# Patient Record
Sex: FEMALE | Race: BLACK OR AFRICAN AMERICAN | NOT HISPANIC OR LATINO | URBAN - METROPOLITAN AREA
[De-identification: names, ages, dates, MRNs, and addresses within clinical notes are randomized per-mention and may not be internally consistent; named-entity substitution may affect disease eponyms.]

---

## 2018-08-22 ENCOUNTER — EMERGENCY (EMERGENCY)
Facility: HOSPITAL | Age: 43
LOS: 0 days | Discharge: HOME | End: 2018-08-22
Admitting: EMERGENCY MEDICINE

## 2018-08-22 VITALS
RESPIRATION RATE: 18 BRPM | DIASTOLIC BLOOD PRESSURE: 72 MMHG | TEMPERATURE: 98 F | HEART RATE: 81 BPM | OXYGEN SATURATION: 100 % | SYSTOLIC BLOOD PRESSURE: 148 MMHG
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
## 2018-08-22 DIAGNOSIS — F17.200 NICOTINE DEPENDENCE, UNSPECIFIED, UNCOMPLICATED: ICD-10-CM

## 2018-08-22 DIAGNOSIS — M54.5 LOW BACK PAIN: ICD-10-CM
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
## 2018-08-22 DIAGNOSIS — M25.541 PAIN IN JOINTS OF RIGHT HAND: ICD-10-CM
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
## 2018-08-22 RX ORDER — METHOCARBAMOL 500 MG/1
2 TABLET, FILM COATED ORAL
Qty: 30 | Refills: 0 | OUTPATIENT
Start: 2018-08-22 | End: 2018-08-26

## 2018-08-22 RX ORDER — METHOCARBAMOL 500 MG/1
1500 TABLET, FILM COATED ORAL ONCE
Qty: 0 | Refills: 0 | Status: COMPLETED | OUTPATIENT
Start: 2018-08-22 | End: 2018-08-22

## 2018-08-22 RX ORDER — IBUPROFEN 200 MG
800 TABLET ORAL ONCE
Qty: 0 | Refills: 0 | Status: COMPLETED | OUTPATIENT
Start: 2018-08-22 | End: 2018-08-22

## 2018-08-22 RX ADMIN — Medication 800 MILLIGRAM(S): at 15:55

## 2018-08-22 RX ADMIN — METHOCARBAMOL 1500 MILLIGRAM(S): 500 TABLET, FILM COATED ORAL at 15:55

## 2018-08-22 NOTE — ED PROVIDER NOTE - PHYSICAL EXAMINATION
VITAL SIGNS: I have reviewed nursing notes and confirm.  CONSTITUTIONAL: Well-developed; well-nourished; in no acute distress.  SKIN: Skin exam is warm and dry, <2 sec cap refill. no erythema, ecchymosis  CARD: RRR, 2+ dp pulses  ABD: soft non tender. No CVA tenderness Negative Mcburneys, Negative [place], Negative Psoas, Negative obturator   EXT: Normal ROM. Normal gait. no midline vertebral tenderness. R sided lumbar paraspinal tenderness. pain with radiation down R leg with palpation of sciatic notch. R hand TTP, FROM of upper extremity. NVI  NEURO: Alert and oriented, face symmetric and speech fluent. Strength 5/5 x 4 ext and symmetric, nml gross motor movement,  nml gait. No focal deficits noted. neurovascularly intact. negative romberg, negative pronator drift. normal gait. normal finger to nose. VITAL SIGNS: I have reviewed nursing notes and confirm.  CONSTITUTIONAL: Well-developed; well-nourished; in no acute distress.  SKIN: Skin exam is warm and dry, <2 sec cap refill. no erythema, ecchymosis  CARD: RRR, 2+ dp pulses  ABD: soft non tender. No CVA tenderness Negative Mcburneys, Negative [place], Negative Psoas, Negative obturator   EXT: Normal ROM. Normal gait. no midline vertebral tenderness. R sided lumbar paraspinal tenderness. pain with radiation down R leg with palpation of sciatic notch. R hand TTP on anterior proximal aspect of mid hand, no scaphoid tenderness, FROM of upper extremity. NVI  NEURO: Alert and oriented, face symmetric and speech fluent. Strength 5/5 x 4 ext and symmetric, nml gross motor movement,  nml gait. No focal deficits noted. neurovascularly intact. negative romberg, negative pronator drift. normal gait. normal finger to nose.

## 2018-08-22 NOTE — ED PROVIDER NOTE - NS ED ROS FT
Review of Systems:  CONSTITUTIONAL: no fever  EYES: no photophobia, no blurred vision  ENT: no ear pain, no nasal discharge  RESPIRATORY: no shortness of breath, no cough  CARDIAC: no chest pain, no palpitations  GI: no abd pain, no nausea, no vomiting,   : no dysuria; no hematuria,   MUSCULOSKELETAL: +back pain, + R hand pain   NEUROLOGIC: no headache, no numbness/tingling/weakness, no LOC

## 2018-08-22 NOTE — ED ADULT TRIAGE NOTE - CHIEF COMPLAINT QUOTE
fell down 4 steps onto buttock. denies hitting head or any LOC, GCS=15. denies taking blood thinners daily.

## 2018-08-22 NOTE — ED PROVIDER NOTE - OBJECTIVE STATEMENT
44 y/o F, h/o chronic back pain, presents with R low back pain and L hand pain s/p slip and fall on slippery steps at around 930 this morning. Pt states she landed on her buttocks, and did not hit her head. She was able to stand on her own and was ambulatory after. Pt notes increased pain with palpation. no blood thinner use. Denies fever, chills, CP, SOB, numbness, tingling, weakness, radiation.

## 2018-08-22 NOTE — ED PROVIDER NOTE - PROGRESS NOTE DETAILS
no concern for pregnancy-refusing POCT pregnancy test. will give ibuprofen I was directly involved in the care of this patient. PA Fellow Le note/plan reviewed and agreed. xrays negative follow up with pmd discussed

## 2021-04-05 ENCOUNTER — OUTPATIENT (OUTPATIENT)
Dept: OUTPATIENT SERVICES | Facility: HOSPITAL | Age: 46
LOS: 1 days | Discharge: HOME | End: 2021-04-05

## 2021-04-05 DIAGNOSIS — Z01.21 ENCOUNTER FOR DENTAL EXAMINATION AND CLEANING WITH ABNORMAL FINDINGS: ICD-10-CM

## 2021-04-05 PROBLEM — M54.9 DORSALGIA, UNSPECIFIED: Chronic | Status: ACTIVE | Noted: 2018-08-22

## 2021-05-25 ENCOUNTER — OUTPATIENT (OUTPATIENT)
Dept: OUTPATIENT SERVICES | Facility: HOSPITAL | Age: 46
LOS: 1 days | Discharge: HOME | End: 2021-05-25

## 2021-06-28 ENCOUNTER — OUTPATIENT (OUTPATIENT)
Dept: OUTPATIENT SERVICES | Facility: HOSPITAL | Age: 46
LOS: 1 days | Discharge: HOME | End: 2021-06-28

## 2021-08-13 ENCOUNTER — OUTPATIENT (OUTPATIENT)
Dept: OUTPATIENT SERVICES | Facility: HOSPITAL | Age: 46
LOS: 1 days | Discharge: HOME | End: 2021-08-13

## 2021-10-15 ENCOUNTER — OUTPATIENT (OUTPATIENT)
Dept: OUTPATIENT SERVICES | Facility: HOSPITAL | Age: 46
LOS: 1 days | Discharge: HOME | End: 2021-10-15

## 2021-10-22 ENCOUNTER — OUTPATIENT (OUTPATIENT)
Dept: OUTPATIENT SERVICES | Facility: HOSPITAL | Age: 46
LOS: 1 days | Discharge: HOME | End: 2021-10-22

## 2021-10-29 ENCOUNTER — OUTPATIENT (OUTPATIENT)
Dept: OUTPATIENT SERVICES | Facility: HOSPITAL | Age: 46
LOS: 1 days | Discharge: HOME | End: 2021-10-29

## 2022-01-04 ENCOUNTER — OUTPATIENT (OUTPATIENT)
Dept: OUTPATIENT SERVICES | Facility: HOSPITAL | Age: 47
LOS: 1 days | End: 2022-01-04

## 2022-02-08 ENCOUNTER — OUTPATIENT (OUTPATIENT)
Dept: OUTPATIENT SERVICES | Facility: HOSPITAL | Age: 47
LOS: 1 days | Discharge: HOME | End: 2022-02-08

## 2022-05-17 ENCOUNTER — OUTPATIENT (OUTPATIENT)
Dept: OUTPATIENT SERVICES | Facility: HOSPITAL | Age: 47
LOS: 1 days | Discharge: HOME | End: 2022-05-17

## 2022-05-19 ENCOUNTER — OUTPATIENT (OUTPATIENT)
Dept: OUTPATIENT SERVICES | Facility: HOSPITAL | Age: 47
LOS: 1 days | Discharge: HOME | End: 2022-05-19

## 2022-05-20 DIAGNOSIS — K02.63 DENTAL CARIES ON SMOOTH SURFACE PENETRATING INTO PULP: ICD-10-CM

## 2022-12-29 ENCOUNTER — OUTPATIENT (OUTPATIENT)
Dept: OUTPATIENT SERVICES | Facility: HOSPITAL | Age: 47
LOS: 1 days | End: 2022-12-29

## 2023-01-26 ENCOUNTER — OUTPATIENT (OUTPATIENT)
Dept: OUTPATIENT SERVICES | Facility: HOSPITAL | Age: 48
LOS: 1 days | End: 2023-01-26

## 2023-01-27 DIAGNOSIS — K02.63 DENTAL CARIES ON SMOOTH SURFACE PENETRATING INTO PULP: ICD-10-CM

## 2023-02-09 ENCOUNTER — OUTPATIENT (OUTPATIENT)
Dept: OUTPATIENT SERVICES | Facility: HOSPITAL | Age: 48
LOS: 1 days | End: 2023-02-09

## 2023-02-09 DIAGNOSIS — Z01.20 ENCOUNTER FOR DENTAL EXAMINATION AND CLEANING WITHOUT ABNORMAL FINDINGS: ICD-10-CM

## 2023-02-09 PROCEDURE — D2752: CPT

## 2023-02-10 DIAGNOSIS — Z01.20 ENCOUNTER FOR DENTAL EXAMINATION AND CLEANING WITHOUT ABNORMAL FINDINGS: ICD-10-CM

## 2023-02-23 ENCOUNTER — OUTPATIENT (OUTPATIENT)
Dept: OUTPATIENT SERVICES | Facility: HOSPITAL | Age: 48
LOS: 1 days | End: 2023-02-23

## 2023-02-23 DIAGNOSIS — Z01.20 ENCOUNTER FOR DENTAL EXAMINATION AND CLEANING WITHOUT ABNORMAL FINDINGS: ICD-10-CM

## 2023-02-27 DIAGNOSIS — K02.63 DENTAL CARIES ON SMOOTH SURFACE PENETRATING INTO PULP: ICD-10-CM

## 2023-06-15 ENCOUNTER — OUTPATIENT (OUTPATIENT)
Dept: OUTPATIENT SERVICES | Facility: HOSPITAL | Age: 48
LOS: 1 days | End: 2023-06-15
Payer: COMMERCIAL

## 2023-06-15 DIAGNOSIS — K08.109 COMPLETE LOSS OF TEETH, UNSPECIFIED CAUSE, UNSPECIFIED CLASS: ICD-10-CM

## 2023-06-15 PROCEDURE — D2752: CPT

## 2023-06-16 DIAGNOSIS — K08.109 COMPLETE LOSS OF TEETH, UNSPECIFIED CAUSE, UNSPECIFIED CLASS: ICD-10-CM

## 2023-06-26 DIAGNOSIS — K02.63 DENTAL CARIES ON SMOOTH SURFACE PENETRATING INTO PULP: ICD-10-CM

## 2023-07-13 ENCOUNTER — OUTPATIENT (OUTPATIENT)
Dept: OUTPATIENT SERVICES | Facility: HOSPITAL | Age: 48
LOS: 1 days | End: 2023-07-13
Payer: COMMERCIAL

## 2023-07-13 DIAGNOSIS — Z01.20 ENCOUNTER FOR DENTAL EXAMINATION AND CLEANING WITHOUT ABNORMAL FINDINGS: ICD-10-CM

## 2023-07-13 PROCEDURE — D2752: CPT

## 2023-07-26 DIAGNOSIS — K02.63 DENTAL CARIES ON SMOOTH SURFACE PENETRATING INTO PULP: ICD-10-CM

## 2023-08-10 ENCOUNTER — OUTPATIENT (OUTPATIENT)
Dept: OUTPATIENT SERVICES | Facility: HOSPITAL | Age: 48
LOS: 1 days | End: 2023-08-10

## 2023-08-10 DIAGNOSIS — K08.109 COMPLETE LOSS OF TEETH, UNSPECIFIED CAUSE, UNSPECIFIED CLASS: ICD-10-CM

## 2023-08-24 DIAGNOSIS — K02.63 DENTAL CARIES ON SMOOTH SURFACE PENETRATING INTO PULP: ICD-10-CM

## 2023-11-28 ENCOUNTER — OUTPATIENT (OUTPATIENT)
Dept: OUTPATIENT SERVICES | Facility: HOSPITAL | Age: 48
LOS: 1 days | End: 2023-11-28
Payer: COMMERCIAL

## 2023-11-28 DIAGNOSIS — K08.109 COMPLETE LOSS OF TEETH, UNSPECIFIED CAUSE, UNSPECIFIED CLASS: ICD-10-CM

## 2023-11-28 PROCEDURE — D2752: CPT

## 2023-11-29 DIAGNOSIS — K08.109 COMPLETE LOSS OF TEETH, UNSPECIFIED CAUSE, UNSPECIFIED CLASS: ICD-10-CM

## 2023-12-08 DIAGNOSIS — K02.63 DENTAL CARIES ON SMOOTH SURFACE PENETRATING INTO PULP: ICD-10-CM

## 2023-12-21 ENCOUNTER — OUTPATIENT (OUTPATIENT)
Dept: OUTPATIENT SERVICES | Facility: HOSPITAL | Age: 48
LOS: 1 days | End: 2023-12-21
Payer: COMMERCIAL

## 2023-12-21 DIAGNOSIS — K02.9 DENTAL CARIES, UNSPECIFIED: ICD-10-CM

## 2023-12-21 PROCEDURE — D2954: CPT

## 2023-12-26 DIAGNOSIS — K02.9 DENTAL CARIES, UNSPECIFIED: ICD-10-CM

## 2024-01-04 ENCOUNTER — OUTPATIENT (OUTPATIENT)
Dept: OUTPATIENT SERVICES | Facility: HOSPITAL | Age: 49
LOS: 1 days | End: 2024-01-04

## 2024-01-04 DIAGNOSIS — K08.109 COMPLETE LOSS OF TEETH, UNSPECIFIED CAUSE, UNSPECIFIED CLASS: ICD-10-CM

## 2024-01-05 DIAGNOSIS — K02.9 DENTAL CARIES, UNSPECIFIED: ICD-10-CM

## 2024-01-18 ENCOUNTER — OUTPATIENT (OUTPATIENT)
Dept: OUTPATIENT SERVICES | Facility: HOSPITAL | Age: 49
LOS: 1 days | End: 2024-01-18

## 2024-01-18 DIAGNOSIS — K08.109 COMPLETE LOSS OF TEETH, UNSPECIFIED CAUSE, UNSPECIFIED CLASS: ICD-10-CM

## 2024-02-01 ENCOUNTER — OUTPATIENT (OUTPATIENT)
Dept: OUTPATIENT SERVICES | Facility: HOSPITAL | Age: 49
LOS: 1 days | End: 2024-02-01

## 2024-02-01 DIAGNOSIS — K08.109 COMPLETE LOSS OF TEETH, UNSPECIFIED CAUSE, UNSPECIFIED CLASS: ICD-10-CM

## 2024-02-02 DIAGNOSIS — K08.539 FRACTURED DENTAL RESTORATIVE MATERIAL, UNSPECIFIED: ICD-10-CM

## 2025-03-12 ENCOUNTER — EMERGENCY (EMERGENCY)
Facility: HOSPITAL | Age: 50
LOS: 0 days | Discharge: HOME | End: 2025-03-12
Attending: STUDENT IN AN ORGANIZED HEALTH CARE EDUCATION/TRAINING PROGRAM
Payer: MEDICARE

## 2025-03-12 VITALS
RESPIRATION RATE: 18 BRPM | DIASTOLIC BLOOD PRESSURE: 93 MMHG | WEIGHT: 293 LBS | OXYGEN SATURATION: 100 % | HEART RATE: 72 BPM | TEMPERATURE: 99 F | SYSTOLIC BLOOD PRESSURE: 195 MMHG

## 2025-03-12 PROCEDURE — 73090 X-RAY EXAM OF FOREARM: CPT | Mod: 26,RT

## 2025-03-12 PROCEDURE — 73130 X-RAY EXAM OF HAND: CPT | Mod: RT

## 2025-03-12 PROCEDURE — 99284 EMERGENCY DEPT VISIT MOD MDM: CPT | Mod: 25

## 2025-03-12 PROCEDURE — 99284 EMERGENCY DEPT VISIT MOD MDM: CPT

## 2025-03-12 PROCEDURE — 73110 X-RAY EXAM OF WRIST: CPT | Mod: 26,RT

## 2025-03-12 PROCEDURE — 73110 X-RAY EXAM OF WRIST: CPT | Mod: RT

## 2025-03-12 PROCEDURE — 73130 X-RAY EXAM OF HAND: CPT | Mod: 26,RT

## 2025-03-12 PROCEDURE — 73090 X-RAY EXAM OF FOREARM: CPT | Mod: RT

## 2025-03-12 RX ORDER — IBUPROFEN 200 MG
600 TABLET ORAL ONCE
Refills: 0 | Status: COMPLETED | OUTPATIENT
Start: 2025-03-12 | End: 2025-03-12

## 2025-03-12 RX ADMIN — Medication 600 MILLIGRAM(S): at 20:36

## 2025-03-12 NOTE — ED PROVIDER NOTE - PROVIDER TOKENS
FREE:[LAST:[your primary doctor],PHONE:[(   )    -],FAX:[(   )    -],FOLLOWUP:[1-3 Days]],PROVIDER:[TOKEN:[[id number]],FOLLOWUP:[1-3 Days]]

## 2025-03-12 NOTE — ED PROVIDER NOTE - PATIENT PORTAL LINK FT
You can access the FollowMyHealth Patient Portal offered by [facility] by registering at the following website: [email or web address]. By joining [facility]’s FollowMyHealth portal, you will also be able to view your health information using other applications (apps) compatible with our system.

## 2025-03-12 NOTE — ED PROVIDER NOTE - CARE PROVIDER_API CALL
your primary doctor,   Phone: (   )    -  Fax: (   )    -  Follow Up Time: 1-3 Days    Fortino Naidu  Orthopaedic Surgery  [address] [place] [address]  Phone: [phone]  Fax: [phone]  Follow Up Time: 1-3 Days

## 2025-03-12 NOTE — ED PROVIDER NOTE - CLINICAL SUMMARY MEDICAL DECISION MAKING FREE TEXT BOX
49-year-old female presents to the ED for evaluation of right wrist pain after an injury 1 month prior to evaluation.  States that the pain is intermittent and occasionally worsens with overuse.  No edema.  No fevers.  On exam no evidence of cellulitis, no significant bony tenderness on exam, full range of motion of wrist is intact.  Neurovascular tact right hand.  Xray images independently interpreted by me, Dr. Santos, no acute fractures.  Stable for discharge, follow-up with hand surgery for further management.

## 2025-03-12 NOTE — ED PROVIDER NOTE - OBJECTIVE STATEMENT
Patient is a 49-year-old female no significant PMH coming to the ED for right wrist injury.  Patient reports 1 month ago had mechanical trip and fall landing on her right hand/wrist, has had pain since saw her doctor/physical therapist and has been doing physical therapy for her wrist with minimal relief.  Denies numbness/paresthesia, weakness, other extremity injury/pain, head trauma, LOC, neck pain, chest pain, shortness of breath, abdominal pain, nausea vomiting diarrhea constipation

## 2025-03-12 NOTE — ED PROVIDER NOTE - PHYSICAL EXAMINATION
CONST: Well appearing in NAD  EYES: EOMI, Sclera and conjunctiva clear.   ENT: No nasal discharge. Oropharynx normal appearing, no erythema or exudates. Uvula midline.  CARD: Normal S1 S2; Normal rate and rhythm  RESP: Equal BS B/L, No wheezes, rhonchi or rales. No distress  GI: Soft, non-distended.  MS: Normal ROM in all extremities. TTP right wrist without bony deformity or skin changes  SKIN: Warm, dry, no acute rashes. Good turgor  NEURO: A&Ox3, No focal deficits. Strength 5/5 with no sensory deficits. Steady gait

## 2025-03-12 NOTE — ED PROVIDER NOTE - NSFOLLOWUPINSTRUCTIONS_ED_ALL_ED_FT
FOLLOW UP WITH ORTHOPEDICS  FOLLOW UP WITH YOUR PRIMARY DOCTOR  RETURN TO ED FOR NEW OR WORSENING SYMPTOMS    Wrist Injury    WHAT YOU NEED TO KNOW:    What is a wrist injury? A wrist injury is damage to the tissues of your wrist joint. Examples are a fracture, sprain (stretched or torn ligament), or strain (stretched or torn tendon).    What are the signs and symptoms of a wrist injury?    Pain, weakness, or numbness in your wrist or hand    A feeling of something clicking, popping, or tearing inside your wrist    A change in the shape of your wrist, hand, or fingers    Trouble moving your wrist or hand  How is a wrist injury diagnosed? Your healthcare provider will check for pain, swelling, or numbness. He or she may check the movement and strength of your wrist. An x-ray, MRI, or CT scan may show if you have broken a bone or other injury. You may be given contrast liquid to help your wrist show up better in the pictures. Tell the healthcare provider if you have ever had an allergic reaction to contrast liquid. Do not enter the MRI room with anything metal. Metal can cause serious injury. Tell the healthcare provider if you have any metal in or on your body.    How is a wrist injury treated? Your treatment depends on the type of wrist injury and amount of tissue damage you have. You may need any of the following:    A wrist support, such as a cast or splint, will help support your wrist and prevent more damage.    NSAIDs help decrease swelling and pain or fever. This medicine is available with or without a doctor's order. NSAIDs can cause stomach bleeding or kidney problems in certain people. If you take blood thinner medicine, always ask your healthcare provider if NSAIDs are safe for you. Always read the medicine label and follow directions.    Prescription pain medicine may be given. Ask your healthcare provider how to take this medicine safely. Some prescription pain medicines contain acetaminophen. Do not take other medicines that contain acetaminophen without talking to your healthcare provider. Too much acetaminophen may cause liver damage. Prescription pain medicine may cause constipation. Ask your healthcare provider how to prevent or treat constipation.    Steroids decrease swelling and pain in your wrist. This may be given as a shot.    Surgery may be used to repair a tear or remove injured and loose tissues. A torn ligament may be repaired or replaced. Screws or wires may be used to attach the bones in your wrist together.  How can I manage my symptoms?    Rest your wrist for 48 hours, or as directed. Avoid activities that cause pain. Ask which activities you should avoid and for how long. Ask when you may return to your regular physical activities or sports. If you start to use your wrist too soon, you may injure it wrist again.    Put ice on your wrist to decrease pain and swelling. Use an ice pack, or put crushed ice in a plastic bag. Wrap a towel around the bag before you put it on your wrist. Apply ice for 15 minutes every hour, or as directed.    Apply compression by wrapping your wrist with an elastic bandage. This will help decrease swelling, support your wrist, and help it heal. Wear your wrist wrap as directed. The bandage should be snug but not so tight that your fingers are numb or tingly.    Elevate your wrist above the level of your heart when you sit or lie down. Prop your arm and hand on pillows to keep your wrist elevated comfortably.    Go to physical therapy, if directed. A physical therapist can teach you exercises to strengthen your wrist and improve the range of movement. These exercises may also help decrease your pain.  How can I prevent a wrist injury?    Do strengthening exercises. Your healthcare provider or physical therapist may suggest that you do exercises to strengthen your hand and arm muscles. He or she will tell you when to start doing these exercises and how long to continue.    Protect your wrists. Wrist guard splints or protective tape can help support your wrist during exercise and sports. These devices may also keep your wrist from bending too far back. Ask for more information about the type of wrist support that you should use.  When should I seek immediate care?    The skin on or near your wrist or hand feels cold or turns blue or white.    The skin on or near your wrist or hand is tight, raised, and swollen.    You have new trouble moving and using your hands, fingers, or wrist.    Your wrist, hands, or fingers become swollen, red, numb, or tingle.    You have any open wounds that are red, swollen, warm, or have pus coming from them.  When should I call my doctor?    You have a fever.    The bruising, swelling, or pain in your wrist gets worse.    You have questions or concerns about your condition or care.  CARE AGREEMENT:    You have the right to help plan your care. Learn about your health condition and how it may be treated. Discuss treatment options with your healthcare providers to decide what care you want to receive. You always have the right to refuse treatment.

## 2025-03-12 NOTE — ED PROVIDER NOTE - PROGRESS NOTE DETAILS
no acute fractures seen on xray, pt has wrist immobilizer/ splint from [facility] so no splint necessary here in ED